# Patient Record
Sex: MALE | Race: ASIAN | NOT HISPANIC OR LATINO
[De-identification: names, ages, dates, MRNs, and addresses within clinical notes are randomized per-mention and may not be internally consistent; named-entity substitution may affect disease eponyms.]

---

## 2020-01-17 PROBLEM — Z00.00 ENCOUNTER FOR PREVENTIVE HEALTH EXAMINATION: Status: ACTIVE | Noted: 2020-01-17

## 2020-02-10 ENCOUNTER — APPOINTMENT (OUTPATIENT)
Dept: COLORECTAL SURGERY | Facility: CLINIC | Age: 45
End: 2020-02-10
Payer: COMMERCIAL

## 2020-02-10 VITALS
TEMPERATURE: 97.3 F | DIASTOLIC BLOOD PRESSURE: 84 MMHG | HEART RATE: 62 BPM | WEIGHT: 209 LBS | SYSTOLIC BLOOD PRESSURE: 123 MMHG | BODY MASS INDEX: 29.92 KG/M2 | HEIGHT: 70 IN

## 2020-02-10 DIAGNOSIS — K64.8 OTHER HEMORRHOIDS: ICD-10-CM

## 2020-02-10 DIAGNOSIS — Z83.3 FAMILY HISTORY OF DIABETES MELLITUS: ICD-10-CM

## 2020-02-10 DIAGNOSIS — Z80.0 FAMILY HISTORY OF MALIGNANT NEOPLASM OF DIGESTIVE ORGANS: ICD-10-CM

## 2020-02-10 DIAGNOSIS — Z80.8 FAMILY HISTORY OF MALIGNANT NEOPLASM OF OTHER ORGANS OR SYSTEMS: ICD-10-CM

## 2020-02-10 DIAGNOSIS — Z80.3 FAMILY HISTORY OF MALIGNANT NEOPLASM OF BREAST: ICD-10-CM

## 2020-02-10 DIAGNOSIS — Z82.49 FAMILY HISTORY OF ISCHEMIC HEART DISEASE AND OTHER DISEASES OF THE CIRCULATORY SYSTEM: ICD-10-CM

## 2020-02-10 DIAGNOSIS — Z83.49 FAMILY HISTORY OF OTHER ENDOCRINE, NUTRITIONAL AND METABOLIC DISEASES: ICD-10-CM

## 2020-02-10 PROCEDURE — 99202 OFFICE O/P NEW SF 15 MIN: CPT | Mod: 25

## 2020-02-10 PROCEDURE — 46221 LIGATION OF HEMORRHOID(S): CPT

## 2020-02-10 RX ORDER — FLUOCINOLONE ACETONIDE 0.25 MG/G
0.03 OINTMENT TOPICAL 3 TIMES DAILY
Qty: 1 | Refills: 2 | Status: ACTIVE | COMMUNITY
Start: 2020-02-10 | End: 1900-01-01

## 2020-02-10 NOTE — ASSESSMENT
[FreeTextEntry1] : I had a detailed discussion with the patient regarding optimization of bowel movements with increasing daily fiber and water intake. Both synthetic and dietary fiber recommendations were reviewed. I had strongly counseled the patient regarding the need for increasing water to help improve  bowel function.\par \par I discussed with the patient that improving  bowel function will alleviate  hemorrhoidal symptoms.\par \par Advised return in 4-6 weeks if symptoms are persistent.\par \par Advised the patient of the etiology and treatment of pruritus ani.  Recommendations including appropriate perianal hygiene changes, avoidance of soaps and astringents, lubricating lotions and avoidance of excessive wiping detailed.\par \par Advised increasing fiber regimen.\par \par A trial of topical steroid cream was recommended.\par \par Advised return in 3-4 weeks if symptoms persist for perianal biopsy of the skin.\par

## 2020-02-10 NOTE — HISTORY OF PRESENT ILLNESS
[FreeTextEntry1] : 45 yo M presents for evaluation of hemorrhoids, referred by ELIUD Powers\par Reports hemorrhoids for the last 15 years, likely related to weight lifting\par \par hx of thrombosed hemorrhoids s/p excision approx 6-7 years ago and ? 2-3 years ago w/ CRS at [facility]\par c/o of prolapsed hemorrhoids w/ every BM that requires manual reduction x 5-10 years\par Reports itching has worsened over the 4-5 years, itching worse in the evening\par (+) hemorrhoids make hygiene difficult\par Has since increased fiber, no hx of topical medications\par Denies pain, burning or bleeding\par Denies constipation or diarrhea\par \par BH: once, no straining\par Reports adequate intake of dietary fiber at present and admits could improve water\par Uses Metamucil daily\par \par Flex sigmoidoscopy w/ Dr. Powers 1/16/20: Grade II-III prolapsing internal hemorrhoids w/ recent signs of bleeding\par Initial colonoscopy in 2018, normal\par Father dx w/ colorectal CA age 68 w/ synchronous lesions in tumor ?[facility]\par No ASA/NSAID use\par

## 2020-02-10 NOTE — PHYSICAL EXAM
[Excoriation] : excoriations [Normal] : was normal [None] : there was no rectal mass  [de-identified] : mild perianal / anal margin fissuring. [FreeTextEntry1] : A lighted anoscope was passed into the anal canal and the entire anal mucosal surface was inspected..  The findings revealed moderate / large  internal hemorrhoids. No masses or lesions were identified.\par \par The risks and benefits of rubber band ligation were discussed with the patient including but not limited to bleeding, pain, infection, and the need for future procedures. The anoscope was placed and rubber band ligation was performed of the internal hemorrhoids- rpq and left lateral  with good result. The patient tolerated the procedure well. Appropriate postprocedure instructions were given to the patient.\par

## 2020-05-08 ENCOUNTER — APPOINTMENT (OUTPATIENT)
Dept: COLORECTAL SURGERY | Facility: CLINIC | Age: 45
End: 2020-05-08